# Patient Record
Sex: FEMALE | Race: WHITE
[De-identification: names, ages, dates, MRNs, and addresses within clinical notes are randomized per-mention and may not be internally consistent; named-entity substitution may affect disease eponyms.]

---

## 2018-02-12 ENCOUNTER — HOSPITAL ENCOUNTER (OUTPATIENT)
Dept: HOSPITAL 17 - HSDC | Age: 83
Discharge: HOME | End: 2018-02-12
Attending: PLASTIC SURGERY
Payer: MEDICARE

## 2018-02-12 VITALS — HEIGHT: 65 IN | BODY MASS INDEX: 25.34 KG/M2 | WEIGHT: 152.12 LBS

## 2018-02-12 VITALS
OXYGEN SATURATION: 99 % | TEMPERATURE: 97.7 F | DIASTOLIC BLOOD PRESSURE: 74 MMHG | SYSTOLIC BLOOD PRESSURE: 171 MMHG | RESPIRATION RATE: 18 BRPM | HEART RATE: 63 BPM

## 2018-02-12 DIAGNOSIS — I10: ICD-10-CM

## 2018-02-12 DIAGNOSIS — G56.02: Primary | ICD-10-CM

## 2018-02-12 DIAGNOSIS — Z01.818: ICD-10-CM

## 2018-02-12 DIAGNOSIS — Z01.810: ICD-10-CM

## 2018-02-12 DIAGNOSIS — J45.909: ICD-10-CM

## 2018-02-12 DIAGNOSIS — E03.9: ICD-10-CM

## 2018-02-12 LAB
BASOPHILS # BLD AUTO: 0.2 TH/MM3 (ref 0–0.2)
BASOPHILS NFR BLD: 3.6 % (ref 0–2)
EOSINOPHIL # BLD: 0.3 TH/MM3 (ref 0–0.4)
EOSINOPHIL NFR BLD: 4.5 % (ref 0–4)
ERYTHROCYTE [DISTWIDTH] IN BLOOD BY AUTOMATED COUNT: 15.3 % (ref 11.6–17.2)
HCT VFR BLD CALC: 38.6 % (ref 35–46)
HGB BLD-MCNC: 13.1 GM/DL (ref 11.6–15.3)
LYMPHOCYTES # BLD AUTO: 2.1 TH/MM3 (ref 1–4.8)
LYMPHOCYTES NFR BLD AUTO: 35.7 % (ref 9–44)
MCH RBC QN AUTO: 31.6 PG (ref 27–34)
MCHC RBC AUTO-ENTMCNC: 33.9 % (ref 32–36)
MCV RBC AUTO: 93 FL (ref 80–100)
MONOCYTE #: 0.8 TH/MM3 (ref 0–0.9)
MONOCYTES NFR BLD: 13.4 % (ref 0–8)
NEUTROPHILS # BLD AUTO: 2.5 TH/MM3 (ref 1.8–7.7)
NEUTROPHILS NFR BLD AUTO: 42.8 % (ref 16–70)
PLATELET # BLD: 260 TH/MM3 (ref 150–450)
PMV BLD AUTO: 8.9 FL (ref 7–11)
RBC # BLD AUTO: 4.15 MIL/MM3 (ref 4–5.3)
WBC # BLD AUTO: 5.9 TH/MM3 (ref 4–11)

## 2018-02-12 PROCEDURE — 93005 ELECTROCARDIOGRAM TRACING: CPT

## 2018-02-12 PROCEDURE — 01810 ANES PX NRV MUSC F/ARM WRST: CPT

## 2018-02-12 PROCEDURE — 85025 COMPLETE CBC W/AUTO DIFF WBC: CPT

## 2018-02-12 PROCEDURE — 64721 CARPAL TUNNEL SURGERY: CPT

## 2018-02-12 NOTE — EKG
Date Performed: 02/12/2018       Time Performed: 06:38:49

 

PTAGE:      82 years

 

EKG:      Sinus rhythm 

 

 NORMAL ECG 

 

NO PREVIOUS TRACING            

 

DOCTOR:   Nate Worthington  Interpretating Date/Time  02/12/2018 10:49:59

## 2018-02-14 NOTE — PD.OP
__________________________________________________





Operative Report


Date of Surgery:  Feb 12, 2018


Preoperative Diagnosis:  


(1) Carpal tunnel syndrome


Postoperative Diagnosis:  


(1) Carpal tunnel syndrome


Procedure:


Left open carpal tunnel release (57480)


Anesthesia:


Gen.


Surgeon:


Jason Villar


Assistant(s):


.


Operation and Findings:


This is an 82-year-old female who presents with signs and symptoms consistent 

with left carpal tunnel syndrome.  Risks benefits and alternative treatments 

were discussed.  All questions were answered.  Patient expressed understanding.

  Patient elected to assume the risks of open release of her carpal tunnel.  

Informed consent was obtained.  The patient was given antibiotics on call to 

the operating room.  The patient was taken to the operating room.  All pressure 

points were padded.  After appropriate padding, an upper extremity tourniquet 

was placed.  After the smooth induction of IV anesthesia, the surgical site was 

instilled with half percent Marcaine with epinephrine.  The surgical site was 

prepped and draped in the usual sterile fashion.  The upper extremity was 

exsanguinated using an Esmarch.  The tourniquet was inflated to 250 mmHg.  The 

incision was marked just radial to the hypothenar eminence.  The skin over the 

carpal tunnel was sharply incised.  Blunt dissection was used to dissect down 

to the superficial palmar fascia.  This was sharply incised using a 15 blade.  

After exposure of the transverse carpal ligament, it was meticulously incised 

with a 15 blade under direct vision, also using a [place] elevator as a backstop.

  The median nerve was visualized and kept free from injury.  After the 

transverse carpal ligament was completely released, the antebrachial fascia was 

released using the tenotomy scissors.  A full release of the transverse carpal 

ligament was confirmed using manual palpation proximally and distally.  

Hemostasis was obtained using Bovie cautery.  The incision was closed using a 4-

0 nylon in a running horizontal mattress fashion.  The incision was dressed 

with bacitracin Xeroform dry gauze and soft roll.  An appropriately padded 

volar splint was fashioned.  The tourniquet was let down, with the total 

tourniquet time being 13 minutes.  All digits pinked up nicely.  All needle 

sponge and instrument counts were correct 2. The patient was awoken from 

anesthesia and arrived stable and doing well to the PACU.











Jason Villar MD Feb 14, 2018 13:23